# Patient Record
Sex: MALE | Race: OTHER | Employment: STUDENT | URBAN - METROPOLITAN AREA
[De-identification: names, ages, dates, MRNs, and addresses within clinical notes are randomized per-mention and may not be internally consistent; named-entity substitution may affect disease eponyms.]

---
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
## 2020-09-07 ENCOUNTER — HOSPITAL ENCOUNTER (EMERGENCY)
Age: 20
Discharge: HOME | End: 2020-09-07
Payer: COMMERCIAL

## 2020-09-07 VITALS
RESPIRATION RATE: 16 BRPM | HEART RATE: 54 BPM | SYSTOLIC BLOOD PRESSURE: 147 MMHG | OXYGEN SATURATION: 100 % | BODY MASS INDEX: 22.77 KG/M2 | WEIGHT: 187 LBS | HEIGHT: 76 IN | DIASTOLIC BLOOD PRESSURE: 84 MMHG | TEMPERATURE: 97 F

## 2020-09-07 PROCEDURE — 99282 EMERGENCY DEPT VISIT SF MDM: CPT

## 2020-09-07 PROCEDURE — 6370000000 HC RX 637 (ALT 250 FOR IP): Performed by: PHYSICIAN ASSISTANT

## 2020-09-07 RX ORDER — DIPHENHYDRAMINE HCL 25 MG
25 CAPSULE ORAL EVERY 6 HOURS PRN
Qty: 30 CAPSULE | Refills: 0 | Status: SHIPPED | OUTPATIENT
Start: 2020-09-07 | End: 2020-09-17

## 2020-09-07 RX ORDER — PREDNISONE 20 MG/1
60 TABLET ORAL ONCE
Status: COMPLETED | OUTPATIENT
Start: 2020-09-07 | End: 2020-09-07

## 2020-09-07 RX ORDER — PREDNISONE 10 MG/1
10 TABLET ORAL DAILY
Qty: 24 TABLET | Refills: 0 | Status: SHIPPED | OUTPATIENT
Start: 2020-09-07

## 2020-09-07 RX ADMIN — PREDNISONE 60 MG: 20 TABLET ORAL at 13:52

## 2020-09-07 ASSESSMENT — ENCOUNTER SYMPTOMS
RESPIRATORY NEGATIVE: 1
GASTROINTESTINAL NEGATIVE: 1
EYES NEGATIVE: 1

## 2020-09-07 NOTE — ED PROVIDER NOTES
[facility] [facility] ED  eMERGENCY dEPARTMENT eNCOUnter      Pt Name: Shandra Qiu  MRN: [id number]  [place] 2000  Date of evaluation: 9/7/2020  Provider: Verdis Mortimer, PA-C      HISTORY OF PRESENT ILLNESS    Shandra Qiu is a 22 y.o. male who presents to the Emergency Department with chief complaint of rash to lower legs and bilateral upper extremity. Patient states he was doing yard work at his grandma's house yesterday and woke up with diffuse rash. Patient states he had poison ivy in the past approximately 6 years ago. Patient drove himself here today. He has not taken medication prior to arrival.  Patient complains of itching and denies any other complaints. He has no other concerns at this time. REVIEW OF SYSTEMS       Review of Systems   Constitutional: Negative. HENT: Negative. Eyes: Negative. Respiratory: Negative. Cardiovascular: Negative. Gastrointestinal: Negative. Endocrine: Negative. Genitourinary: Negative. Musculoskeletal: Negative. Skin: Positive for rash. Neurological: Negative. Psychiatric/Behavioral: Negative. PAST MEDICAL HISTORY   History reviewed. No pertinent past medical history. SURGICAL HISTORY     History reviewed. No pertinent surgical history. CURRENT MEDICATIONS       Previous Medications    AZITHROMYCIN (ZITHROMAX) 250 MG TABLET    Take 2 tablets (500 mg) on Day 1, followed by 1 tablet (250 mg) once daily on Days 2 through 5. FLUTICASONE (FLONASE) 50 MCG/ACT NASAL SPRAY    1 spray by Nasal route every 12 hours for 15 days       ALLERGIES     Patient has no known allergies. FAMILY HISTORY     History reviewed. No pertinent family history.        SOCIAL HISTORY       Social History     Socioeconomic History    Marital status: Single     Spouse name: None    Number of children: None    Years of education: None    Highest education level: None   Occupational History    None   Social Needs    Financial resource strain: None    Food insecurity     Worry: None     Inability: None    Transportation needs     Medical: None     Non-medical: None   Tobacco Use    Smoking status: Never Smoker   Substance and Sexual Activity    Alcohol use: No     Alcohol/week: 0.0 standard drinks    Drug use: No    Sexual activity: None   Lifestyle    Physical activity     Days per week: None     Minutes per session: None    Stress: None   Relationships    Social connections     Talks on phone: None     Gets together: None     Attends [employer] service: None     Active member of club or organization: None     Attends meetings of clubs or organizations: None     Relationship status: None    Intimate partner violence     Fear of current or ex partner: None     Emotionally abused: None     Physically abused: None     Forced sexual activity: None   Other Topics Concern    None   Social History Narrative    None       SCREENINGS      @FLOW([id number])@      PHYSICAL EXAM    (up to 7 for level 4, 8 or more for level 5)     ED Triage Vitals [09/07/20 1333]   BP Temp Temp Source Pulse Resp SpO2 Height Weight   (!) 147/84 97 °F (36.1 °C) Temporal 54 16 100 % 6' 4\" (1.93 m) 187 lb (84.8 kg)       Physical Exam  Constitutional:       General: He is not in acute distress. Appearance: He is well-developed. HENT:      Head: Normocephalic and atraumatic. Eyes:      Conjunctiva/sclera: Conjunctivae normal.      Pupils: Pupils are equal, round, and reactive to light. Neck:      Musculoskeletal: Normal range of motion and neck supple. Cardiovascular:      Rate and Rhythm: Normal rate and regular rhythm. Heart sounds: No murmur. Pulmonary:      Effort: No respiratory distress. Breath sounds: Normal breath sounds. No wheezing or rales. Abdominal:      General: There is no distension. Palpations: Abdomen is soft. Tenderness: There is no abdominal tenderness. Musculoskeletal: Normal range of motion.

## 2020-09-07 NOTE — ED TRIAGE NOTES
Pt to ER with c/o itchy pink rash all over his arm and legs. States he was cutting down bushes yesterday and they had poison ivy in them. Pt used OTC cream at home but continues to c/o itching. Denies pain.

## 2024-01-12 ENCOUNTER — HOSPITAL ENCOUNTER (EMERGENCY)
Facility: HOSPITAL | Age: 24
Discharge: HOME | End: 2024-01-12
Attending: STUDENT IN AN ORGANIZED HEALTH CARE EDUCATION/TRAINING PROGRAM
Payer: COMMERCIAL

## 2024-01-12 ENCOUNTER — APPOINTMENT (OUTPATIENT)
Dept: RADIOLOGY | Facility: HOSPITAL | Age: 24
End: 2024-01-12
Payer: COMMERCIAL

## 2024-01-12 VITALS
RESPIRATION RATE: 18 BRPM | SYSTOLIC BLOOD PRESSURE: 158 MMHG | WEIGHT: 189.6 LBS | TEMPERATURE: 97.2 F | BODY MASS INDEX: 22.39 KG/M2 | HEART RATE: 60 BPM | DIASTOLIC BLOOD PRESSURE: 81 MMHG | HEIGHT: 77 IN | OXYGEN SATURATION: 99 %
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
## 2024-01-12 PROCEDURE — 99284 EMERGENCY DEPT VISIT MOD MDM: CPT | Performed by: STUDENT IN AN ORGANIZED HEALTH CARE EDUCATION/TRAINING PROGRAM

## 2024-01-12 PROCEDURE — 73090 X-RAY EXAM OF FOREARM: CPT | Mod: LT

## 2024-01-12 PROCEDURE — 2500000005 HC RX 250 GENERAL PHARMACY W/O HCPCS: Performed by: STUDENT IN AN ORGANIZED HEALTH CARE EDUCATION/TRAINING PROGRAM

## 2024-01-12 PROCEDURE — 2500000001 HC RX 250 WO HCPCS SELF ADMINISTERED DRUGS (ALT 637 FOR MEDICARE OP): Performed by: STUDENT IN AN ORGANIZED HEALTH CARE EDUCATION/TRAINING PROGRAM

## 2024-01-12 PROCEDURE — 99283 EMERGENCY DEPT VISIT LOW MDM: CPT | Mod: 25

## 2024-01-12 PROCEDURE — 12002 RPR S/N/AX/GEN/TRNK2.6-7.5CM: CPT | Performed by: STUDENT IN AN ORGANIZED HEALTH CARE EDUCATION/TRAINING PROGRAM

## 2024-01-12 PROCEDURE — 90715 TDAP VACCINE 7 YRS/> IM: CPT | Performed by: STUDENT IN AN ORGANIZED HEALTH CARE EDUCATION/TRAINING PROGRAM

## 2024-01-12 PROCEDURE — 96372 THER/PROPH/DIAG INJ SC/IM: CPT | Performed by: STUDENT IN AN ORGANIZED HEALTH CARE EDUCATION/TRAINING PROGRAM

## 2024-01-12 PROCEDURE — 73090 X-RAY EXAM OF FOREARM: CPT | Mod: LEFT SIDE | Performed by: RADIOLOGY

## 2024-01-12 PROCEDURE — 2500000004 HC RX 250 GENERAL PHARMACY W/ HCPCS (ALT 636 FOR OP/ED): Performed by: STUDENT IN AN ORGANIZED HEALTH CARE EDUCATION/TRAINING PROGRAM

## 2024-01-12 PROCEDURE — 90471 IMMUNIZATION ADMIN: CPT | Performed by: STUDENT IN AN ORGANIZED HEALTH CARE EDUCATION/TRAINING PROGRAM

## 2024-01-12 RX ORDER — LIDOCAINE HYDROCHLORIDE 10 MG/ML
5 INJECTION INFILTRATION; PERINEURAL ONCE
Status: COMPLETED | OUTPATIENT
Start: 2024-01-12 | End: 2024-01-12

## 2024-01-12 RX ORDER — AMOXICILLIN AND CLAVULANATE POTASSIUM 875; 125 MG/1; MG/1
1 TABLET, FILM COATED ORAL EVERY 12 HOURS
Qty: 14 TABLET | Refills: 0 | Status: SHIPPED | OUTPATIENT
Start: 2024-01-12 | End: 2024-01-22

## 2024-01-12 RX ORDER — AMOXICILLIN AND CLAVULANATE POTASSIUM 875; 125 MG/1; MG/1
1 TABLET, FILM COATED ORAL ONCE
Status: COMPLETED | OUTPATIENT
Start: 2024-01-12 | End: 2024-01-12

## 2024-01-12 RX ORDER — BACITRACIN 500 [USP'U]/G
OINTMENT TOPICAL ONCE
Status: COMPLETED | OUTPATIENT
Start: 2024-01-12 | End: 2024-01-12

## 2024-01-12 RX ADMIN — TETANUS TOXOID, REDUCED DIPHTHERIA TOXOID AND ACELLULAR PERTUSSIS VACCINE, ADSORBED 0.5 ML: 5; 2.5; 8; 8; 2.5 SUSPENSION INTRAMUSCULAR at 20:44

## 2024-01-12 RX ADMIN — BACITRACIN: 500 OINTMENT TOPICAL at 21:56

## 2024-01-12 RX ADMIN — LIDOCAINE HYDROCHLORIDE 50 MG: 10 INJECTION, SOLUTION INFILTRATION; PERINEURAL at 20:44

## 2024-01-12 RX ADMIN — AMOXICILLIN AND CLAVULANATE POTASSIUM 875 MG: 875; 125 TABLET, FILM COATED ORAL at 20:45

## 2024-01-12 ASSESSMENT — LIFESTYLE VARIABLES
EVER HAD A DRINK FIRST THING IN THE MORNING TO STEADY YOUR NERVES TO GET RID OF A HANGOVER: NO
HAVE PEOPLE ANNOYED YOU BY CRITICIZING YOUR DRINKING: NO
HAVE YOU EVER FELT YOU SHOULD CUT DOWN ON YOUR DRINKING: NO
EVER FELT BAD OR GUILTY ABOUT YOUR DRINKING: NO
REASON UNABLE TO ASSESS: NO

## 2024-01-12 ASSESSMENT — PAIN SCALES - GENERAL
PAINLEVEL_OUTOF10: 8
PAINLEVEL_OUTOF10: 8

## 2024-01-12 ASSESSMENT — COLUMBIA-SUICIDE SEVERITY RATING SCALE - C-SSRS
2. HAVE YOU ACTUALLY HAD ANY THOUGHTS OF KILLING YOURSELF?: NO
6. HAVE YOU EVER DONE ANYTHING, STARTED TO DO ANYTHING, OR PREPARED TO DO ANYTHING TO END YOUR LIFE?: NO
1. IN THE PAST MONTH, HAVE YOU WISHED YOU WERE DEAD OR WISHED YOU COULD GO TO SLEEP AND NOT WAKE UP?: NO

## 2024-01-12 ASSESSMENT — PAIN - FUNCTIONAL ASSESSMENT: PAIN_FUNCTIONAL_ASSESSMENT: 0-10

## 2024-01-12 ASSESSMENT — PAIN DESCRIPTION - PROGRESSION: CLINICAL_PROGRESSION: NOT CHANGED

## 2024-01-13 NOTE — ED PROVIDER NOTES
"HPI   Chief Complaint   Patient presents with    Animal Bite     \"I was breaking up dogs fighting at friends house and I got my left forearm bit.  Shots are up to date on dog\"       Patient is a 23-year-old male with no significant past medical history who presents for a dog bite.  Patient states he was at a friend's house when he was trying to break up a fight between 2 dogs, was bitten in the left forearm by a pit bull, dog is up-to-date on its shots.  Unknown date of his own last tetanus.  States this happened prior to arrival.  Endorses pain over the left proximal forearm, no numbness or tingling in the hand.                          Bonnie Coma Scale Score: 15                  Patient History   History reviewed. No pertinent past medical history.  History reviewed. No pertinent surgical history.  No family history on file.  Social History     Tobacco Use    Smoking status: Never    Smokeless tobacco: Never   Vaping Use    Vaping Use: Never used   Substance Use Topics    Alcohol use: Not on file    Drug use: Never       Physical Exam   ED Triage Vitals [01/12/24 1904]   Temp Heart Rate Resp BP   36.2 °C (97.2 °F) 62 16 156/86      SpO2 Temp Source Heart Rate Source Patient Position   99 % Tympanic Monitor Sitting      BP Location FiO2 (%)     Right arm --       Physical Exam  Constitutional:       General: He is not in acute distress.  HENT:      Head: Normocephalic.   Eyes:      Extraocular Movements: Extraocular movements intact.      Conjunctiva/sclera: Conjunctivae normal.      Pupils: Pupils are equal, round, and reactive to light.   Cardiovascular:      Rate and Rhythm: Normal rate and regular rhythm.      Pulses: Normal pulses.      Heart sounds: Normal heart sounds.   Pulmonary:      Effort: Pulmonary effort is normal.      Breath sounds: Normal breath sounds.   Abdominal:      General: There is no distension.      Palpations: Abdomen is soft. There is no mass.      Tenderness: There is no abdominal " tenderness. There is no guarding.   Musculoskeletal:         General: No deformity.      Cervical back: Normal range of motion and neck supple.      Right lower leg: No edema.      Left lower leg: No edema.      Comments: Approximately 4 cm laceration to left forearm without involvement of muscle/tendons.  No pulsatile bleeding.  Approximately 5 mm punctate wound to posterior aspect of left proximal forearm.  2+ radial pulses bilaterally   Skin:     General: Skin is warm and dry.      Findings: No lesion or rash.   Neurological:      General: No focal deficit present.      Mental Status: He is alert and oriented to person, place, and time. Mental status is at baseline.      Cranial Nerves: No cranial nerve deficit.      Sensory: No sensory deficit.      Motor: No weakness.      Comments: Intact strength and sensation in bilateral hands, intact sensation in median/radial/ulnar nerve distributions.   Psychiatric:         Mood and Affect: Mood normal.         ED Course & MDM   Diagnoses as of 01/13/24 1035   Dog bite, initial encounter       Medical Decision Making  Patient is a 23-year-old male with above-stated past medical history who presents for a dog bite.  Patient's tetanus was updated, he was given a dose of Augmentin in the emergency department. Xray was negative for foreign bodies, low suspicion for radiolucent foreign body.  His wound was cleaned, irrigated, and repaired, please see set procedure note.  Patient's punctate wound on his left arm was left open given its small size and the nature of the injury, this was irrigated.  Patient was given a prescription for Augmentin.  I discussed strict return precautions with him in detail.  He stated understanding agreement.  I did advise him to follow-up with his physician on Monday for reevaluation.  Patient stated understanding agreement.  No involvement of muscle tissue, nerves, tendons or ligaments.  Patient was discharged home in stable  condition.    Disclaimer: This note was dictated using speech recognition software. Minor errors in transcription may be present. Please call if questions.     Gibran Adams MD  [facility]  Contact on [employer]      Problems Addressed:  Dog bite, initial encounter: acute illness or injury    Amount and/or Complexity of Data Reviewed  Radiology: ordered.        Procedure  Laceration Repair    Performed by: Gibran Adams MD  Authorized by: Gibran Adams MD    Consent:     Consent obtained:  Verbal    Consent given by:  Patient    Risks discussed:  Infection  Anesthesia:     Anesthesia method:  Local infiltration    Local anesthetic:  Lidocaine 1% w/o epi  Laceration details:     Location:  Shoulder/arm    Shoulder/arm location:  L lower arm    Length (cm):  4    Depth (mm):  4  Pre-procedure details:     Preparation:  Patient was prepped and draped in usual sterile fashion and imaging obtained to evaluate for foreign bodies  Exploration:     Limited defect created (wound extended): no      Hemostasis achieved with:  Direct pressure    Imaging outcome: foreign body not noted      Wound exploration: wound explored through full range of motion and entire depth of wound visualized      Wound extent: no muscle damage noted, no nerve damage noted and no tendon damage noted      Contaminated: no    Treatment:     Area cleansed with:  Chlorhexidine    Amount of cleaning:  Standard    Irrigation solution:  Sterile saline    Irrigation volume:  750cc    Irrigation method:  Tap    Visualized foreign bodies/material removed: no      Debridement:  None    Undermining:  None    Scar revision: no    Skin repair:     Repair method:  Sutures    Suture size:  4-0    Suture material:  Prolene    Suture technique:  Simple interrupted    Number of sutures:  3  Approximation:     Approximation:  Loose  Repair type:     Repair type:  Simple  Post-procedure details:     Dressing:  Antibiotic ointment and non-adherent  dressing    Procedure completion:  Tolerated well, no immediate complications       Gibran Adams MD  01/13/24 0797

## 2025-06-24 ENCOUNTER — APPOINTMENT (OUTPATIENT)
Dept: GENERAL RADIOLOGY | Age: 25
DRG: 908 | End: 2025-06-24
Payer: MEDICAID

## 2025-06-24 ENCOUNTER — APPOINTMENT (OUTPATIENT)
Dept: CT IMAGING | Age: 25
DRG: 908 | End: 2025-06-24
Payer: MEDICAID

## 2025-06-24 ENCOUNTER — HOSPITAL ENCOUNTER (INPATIENT)
Age: 25
LOS: 2 days | Discharge: HOME | DRG: 908 | End: 2025-06-26
Admitting: ORTHOPAEDIC SURGERY
Payer: MEDICAID

## 2025-06-24 PROBLEM — S86.221A: Status: ACTIVE | Noted: 2025-06-24

## 2025-06-24 PROBLEM — S81.811A LEG LACERATION, RIGHT, INITIAL ENCOUNTER: Status: ACTIVE | Noted: 2025-06-24

## 2025-06-24 PROBLEM — S87.81XA: Status: ACTIVE | Noted: 2025-06-24

## 2025-06-24 ASSESSMENT — LIFESTYLE VARIABLES
HOW MANY STANDARD DRINKS CONTAINING ALCOHOL DO YOU HAVE ON A TYPICAL DAY: 3 OR 4
HOW OFTEN DO YOU HAVE A DRINK CONTAINING ALCOHOL: 2-4 TIMES A MONTH

## 2025-06-24 ASSESSMENT — PAIN SCALES - GENERAL
PAINLEVEL_OUTOF10: 7
PAINLEVEL_OUTOF10: 10

## 2025-06-24 ASSESSMENT — PAIN DESCRIPTION - LOCATION
LOCATION: LEG
LOCATION: FOOT
LOCATION: ANKLE

## 2025-06-24 ASSESSMENT — PAIN - FUNCTIONAL ASSESSMENT: PAIN_FUNCTIONAL_ASSESSMENT: 0-10

## 2025-06-24 ASSESSMENT — PAIN DESCRIPTION - ORIENTATION
ORIENTATION: RIGHT
ORIENTATION: RIGHT;LOWER

## 2025-06-24 ASSESSMENT — PAIN DESCRIPTION - PAIN TYPE: TYPE: ACUTE PAIN

## 2025-06-24 NOTE — ED PROVIDER NOTES
1:20 PM EDT  [facility] ORTHO TELE  EMERGENCY DEPARTMENT ENCOUNTER      Pt Name: Antonio Chamberlain  MRN: [id number]  Birthdate 2000  Date of evaluation: 6/24/2025  Provider: Kelly Dos Santos MD    CHIEF COMPLAINT       Chief Complaint   Patient presents with    Leg Injury     Engine fell onto legs about 10 min ago         HISTORY OF PRESENT ILLNESS   (Location/Symptom, Timing/Onset, Context/Setting, Quality, Duration, Modifying Factors, Severity)  Note limiting factors.       Antonio Chamberlain is a 24 y.o. male with no significant past medical history who presents to the emergency department for chief complaint of a traumatic injury sustained to his legs.  Patient states that about 10 minutes ago, he was carrying a heavy engine, it slipped out of his hands, and landed on his right and left lower leg, with most of the impact being on the right.  Patient does endorse moving away, but states that the engine still caught him.  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  Patient is endorsing significant pain at this time.  Patient however is neuro vastly intact, and is able to wiggle his toes with distal and pulses noted.  The history is provided by the patient, medical records and a relative.       Nursing Notes were reviewed.    REVIEW OF SYSTEMS    (2-9 systems for level 4, 10 or more for level 5)     Review of Systems   All other systems reviewed and are negative.      Except as noted above the remainder of the review of systems was reviewed and negative.       PAST MEDICAL HISTORY   No past medical history on file.      SURGICAL HISTORY     No past surgical history on file.      CURRENT MEDICATIONS       Current Discharge Medication List        CONTINUE these medications which have NOT CHANGED    Details   predniSONE (DELTASONE) 10 MG tablet Take 1 tablet by mouth daily 6 tablets by mouth on day 1 and decrease by 1 tablet daily until gone  Qty: 24 tablet, Refills: 0      fluticasone (FLONASE) 50 MCG/ACT nasal

## 2025-06-24 NOTE — H&P
HISTORY AND PHYSICAL               Date: 6/24/2025        Patient Name: Antonio Chamberlain     MRN: [id number] YOB: 2000      Age:  24 y.o.      Assessment/Plan      [unfilled]    Right leg soft tissue injury with right ankle arthrotomy and suspected tendon injuries and at least partial laceration of anterior compartment musculature  Weightbearing status: Non weight-bearing right lower extremity  Patient started on prophylactic antibiotics upon arrival to ER, will continue until OR  Tetanus updated  Plan on taking to OR in a.m. for I&D, wound exploration, and repair of any injured tendons or other structures as indicated  Okay for patient to have diet now, will make n.p.o. after midnight    2.  Superficial abrasions over left leg and knee  Weightbearing status: Weightbearing as tolerated left lower extremity  Will manage with local wound care, does not require surgical intervention      Orders Placed This Encounter   Procedures    XR ANKLE LEFT (MIN 3 VIEWS)     Standing Status:   Standing     Number of Occurrences:   1     Reason for exam::   Injury/Pain    XR TIBIA FIBULA LEFT (2 VIEWS)     Standing Status:   Standing     Number of Occurrences:   1     Reason for exam::   Injury/Pain    XR TIBIA FIBULA RIGHT (2 VIEWS)     Standing Status:   Standing     Number of Occurrences:   1     Reason for exam::   crush injury    XR ANKLE RIGHT (MIN 3 VIEWS)     Standing Status:   Standing     Number of Occurrences:   1     Reason for exam::   crush injury    XR FOOT RIGHT (MIN 3 VIEWS)     Standing Status:   Standing     Number of Occurrences:   1     Reason for exam::   crush injury    CTA ABDOMINAL AORTA W BILAT RUNOFF W WO CONTRAST     Standing Status:   Standing     Number of Occurrences:   1     Reason for exam::   crush injury     Additional Contrast?:   None    CT ANKLE RIGHT WO CONTRAST     Standing Status:   Standing     Number of Occurrences:   1     Reason for exam::   evaluation of distal tibia fracture      Decision Support Exception - unselect if not a suspected or confirmed emergency medical condition:   Emergency Medical Condition (MA) [1]    CBC with Auto Differential     Standing Status:   Standing     Number of Occurrences:   1    CMP     Standing Status:   Standing     Number of Occurrences:   1    ETOH     Standing Status:   Standing     Number of Occurrences:   1    CK     Standing Status:   Standing     Number of Occurrences:   1    Magnesium     Standing Status:   Standing     Number of Occurrences:   1    Lactate, Sepsis     Standing Status:   Standing     Number of Occurrences:   2    SPECIMEN REJECTION     Standing Status:   Standing     Number of Occurrences:   1    Protime-INR     Standing Status:   Standing     Number of Occurrences:   1    APTT     Standing Status:   Standing     Number of Occurrences:   1    Apply dressing     Xeroform dressing per dr.     Standing Status:   Standing     Number of Occurrences:   1    Inpatient consult to Orthopedic Surgery     Standing Status:   Standing     Number of Occurrences:   1     Reason for Consult?:   traumatic injury of leg     Provider Contacted?:   Yes    EKG 12 Lead     Standing Status:   Standing     Number of Occurrences:   1     Reason for Exam?:   Screening    Type and Screen     Standing Status:   Standing     Number of Occurrences:   1     Orders Placed This Encounter   Medications    sodium chloride 0.9 % bolus 500 mL    fentaNYL (SUBLIMAZE) injection 50 mcg     Refill:  0    sodium chloride 0.9 % bolus 1,000 mL    tetanus & diphtheria toxoids (adult) 5-2 LFU injection 0.5 mL    ceFAZolin (ANCEF) 2,000 mg in sterile water 20 mL IV syringe     Antimicrobial Indications:   Skin and Soft Tissue Infection     Antimicrobial Indications:   Surgical Prophylaxis    iopamidol (ISOVUE-370) 76 % injection 125 mL    0.9 % sodium chloride infusion    piperacillin-tazobactam (ZOSYN) 3,375 mg in sodium chloride 0.9 % 50 mL IVPB (addEASE)     Antimicrobial

## 2025-06-24 NOTE — H&P
24-year-old man dropped an engine on his right foot has a right foot injury already been evaluated by Ortho and is planned for tendon repair.  Patient already received tetanus shot.  Patient going for in the morning for I&D and wound exploration and repair of any injured tendons.  Does not take any medications at home.  Not does not have any medical history.  Patient received Zosyn in the ER for prophylaxis.          No past medical history on file.  No past surgical history on file.  Social History     Socioeconomic History    Marital status: Single     Spouse name: Not on file    Number of children: Not on file    Years of education: Not on file    Highest education level: Not on file   Occupational History    Not on file   Tobacco Use    Smoking status: Never    Smokeless tobacco: Not on file   Substance and Sexual Activity    Alcohol use: No     Alcohol/week: 0.0 standard drinks of alcohol    Drug use: No    Sexual activity: Not on file   Other Topics Concern    Not on file   Social History Narrative    Not on file     Social Drivers of Health     Financial Resource Strain: Low Risk  (8/1/2024)    Received from [facility]    Overall Financial Resource Strain (CARDIA)     Difficulty of Paying Living Expenses: Not hard at all   Food Insecurity: No Food Insecurity (8/1/2024)    Received from [facility]    Hunger Vital Sign     Worried About Running Out of Food in the Last Year: Never true     Ran Out of Food in the Last Year: Never true   Transportation Needs: No Transportation Needs (8/1/2024)    Received from [facility]    PRAPARE - Transportation     Lack of Transportation (Medical): No     Lack of Transportation (Non-Medical): No   Physical Activity: Inactive (8/1/2024)    Received from [facility]    Exercise Vital Sign     Days of Exercise per Week: 0 days     Minutes of Exercise per Session: 30 min   Stress: No

## 2025-06-24 NOTE — CARE COORDINATION
Case Management Assessment  Initial Evaluation    Date/Time of Evaluation: 6/24/2025 6:14 PM  Assessment Completed by: Reny Rothman RN    If patient is discharged prior to next notation, then this note serves as note for discharge by case management.    Patient Name: Antonio Chamberlain                   YOB: 2000  Diagnosis: Lactic acidosis [E87.20]  Elevated CK [R74.8]  Leg laceration, right, initial encounter [S81.811A]  Crush injury lower leg, right, initial encounter [S87.81XA]  Injury of right lower extremity, initial encounter [S89.91XA]  Anterior tibial tendon tear, traumatic [S86.219A]                   Date / Time: 6/24/2025 12:53 PM    Patient Admission Status: Inpatient   Readmission Risk (Low < 19, Mod (19-27), High > 27): Readmission Risk Score: 3.9    Current PCP: Arjun Gooden, DO  PCP verified by CM? Yes    Chart Reviewed: Yes      History Provided by: Patient  Patient Orientation: Alert and Oriented, Person, Place, Situation, Self    Patient Cognition: Alert    Hospitalization in the last 30 days (Readmission):  No    If yes, Readmission Assessment in CM Navigator will be completed.    Advance Directives:      Code Status: Full Code   Patient's Primary Decision Maker is: Legal Next of Kin (mom Whitney.)      Discharge Planning:    Patient lives with: (P) Alone Type of Home: (P) House  Primary Care Giver: Self  Patient Support Systems include: Family Members   Current Financial resources: Other (Comment) (is employed full time.)  Current community resources: (P) None  Current services prior to admission: (P) None            Current DME:              Type of Home Care services:  (P) None    ADLS  Prior functional level: Independent in ADLs/IADLs  Current functional level: Independent in ADLs/IADLs    PT AM-PAC:   /24  OT AM-PAC:   /24    Family can provide assistance at DC: Yes  Would you like Case Management to discuss the discharge plan with any other family members/significant

## 2025-06-24 NOTE — ED TRIAGE NOTES
A & Ox4. Skin pink, cool, diaphoretic. States he was working on an engine when it fell onto his legs. Lac noted to just above left knee. Ecchymosis noted down most of left lower leg with lac to bottom. Large lac noted to right lower anterior leg down to tendons

## 2025-06-25 ENCOUNTER — ANESTHESIA EVENT (OUTPATIENT)
Dept: OPERATING ROOM | Age: 25
DRG: 908 | End: 2025-06-25
Payer: MEDICAID

## 2025-06-25 ENCOUNTER — ANESTHESIA (OUTPATIENT)
Dept: OPERATING ROOM | Age: 25
DRG: 908 | End: 2025-06-25
Payer: MEDICAID

## 2025-06-25 PROCEDURE — 2500000003 HC RX 250 WO HCPCS: Performed by: ORTHOPAEDIC SURGERY

## 2025-06-25 PROCEDURE — 6360000002 HC RX W HCPCS

## 2025-06-25 PROCEDURE — 6360000002 HC RX W HCPCS: Performed by: ORTHOPAEDIC SURGERY

## 2025-06-25 PROCEDURE — 2500000003 HC RX 250 WO HCPCS: Performed by: REGISTERED NURSE

## 2025-06-25 PROCEDURE — 2580000003 HC RX 258: Performed by: INTERNAL MEDICINE

## 2025-06-25 RX ORDER — FENTANYL CITRATE 50 UG/ML
INJECTION, SOLUTION INTRAMUSCULAR; INTRAVENOUS
Status: DISCONTINUED | OUTPATIENT
Start: 2025-06-25 | End: 2025-06-25 | Stop reason: SDUPTHER

## 2025-06-25 RX ORDER — EPHEDRINE SULFATE/0.9% NACL/PF 25 MG/5 ML
SYRINGE (ML) INTRAVENOUS
Status: DISCONTINUED | OUTPATIENT
Start: 2025-06-25 | End: 2025-06-25 | Stop reason: SDUPTHER

## 2025-06-25 RX ORDER — ONDANSETRON 2 MG/ML
INJECTION INTRAMUSCULAR; INTRAVENOUS
Status: DISCONTINUED | OUTPATIENT
Start: 2025-06-25 | End: 2025-06-25 | Stop reason: SDUPTHER

## 2025-06-25 RX ORDER — DEXAMETHASONE SODIUM PHOSPHATE 10 MG/ML
INJECTION, SOLUTION INTRA-ARTICULAR; INTRALESIONAL; INTRAMUSCULAR; INTRAVENOUS; SOFT TISSUE
Status: DISCONTINUED | OUTPATIENT
Start: 2025-06-25 | End: 2025-06-25 | Stop reason: SDUPTHER

## 2025-06-25 RX ORDER — PROPOFOL 10 MG/ML
INJECTION, EMULSION INTRAVENOUS
Status: DISCONTINUED | OUTPATIENT
Start: 2025-06-25 | End: 2025-06-25 | Stop reason: SDUPTHER

## 2025-06-25 RX ORDER — MIDAZOLAM HYDROCHLORIDE 1 MG/ML
INJECTION, SOLUTION INTRAMUSCULAR; INTRAVENOUS
Status: DISCONTINUED | OUTPATIENT
Start: 2025-06-25 | End: 2025-06-25 | Stop reason: SDUPTHER

## 2025-06-25 RX ADMIN — PROPOFOL 200 MG: 10 INJECTION, EMULSION INTRAVENOUS at 11:47

## 2025-06-25 RX ADMIN — EPHEDRINE SULFATE 5 MG: 5 INJECTION INTRAVENOUS at 12:09

## 2025-06-25 RX ADMIN — MIDAZOLAM HYDROCHLORIDE 2 MG: 1 INJECTION, SOLUTION INTRAMUSCULAR; INTRAVENOUS at 11:42

## 2025-06-25 RX ADMIN — EPHEDRINE SULFATE 5 MG: 5 INJECTION INTRAVENOUS at 13:13

## 2025-06-25 RX ADMIN — PROPOFOL 30 MG: 10 INJECTION, EMULSION INTRAVENOUS at 13:43

## 2025-06-25 RX ADMIN — EPHEDRINE SULFATE 5 MG: 5 INJECTION INTRAVENOUS at 12:07

## 2025-06-25 RX ADMIN — FENTANYL CITRATE 100 MCG: 50 INJECTION, SOLUTION INTRAMUSCULAR; INTRAVENOUS at 11:47

## 2025-06-25 RX ADMIN — ONDANSETRON 4 MG: 2 INJECTION, SOLUTION INTRAMUSCULAR; INTRAVENOUS at 11:47

## 2025-06-25 RX ADMIN — EPHEDRINE SULFATE 5 MG: 5 INJECTION INTRAVENOUS at 13:02

## 2025-06-25 RX ADMIN — CEFAZOLIN 2000 MG: 2 INJECTION, POWDER, FOR SOLUTION INTRAMUSCULAR; INTRAVENOUS at 11:59

## 2025-06-25 RX ADMIN — SODIUM CHLORIDE: 0.9 INJECTION, SOLUTION INTRAVENOUS at 12:13

## 2025-06-25 RX ADMIN — DEXAMETHASONE SODIUM PHOSPHATE 10 MG: 10 INJECTION INTRAMUSCULAR; INTRAVENOUS at 12:26

## 2025-06-25 RX ADMIN — EPHEDRINE SULFATE 5 MG: 5 INJECTION INTRAVENOUS at 13:29

## 2025-06-25 RX ADMIN — FENTANYL CITRATE 100 MCG: 50 INJECTION, SOLUTION INTRAMUSCULAR; INTRAVENOUS at 13:48

## 2025-06-25 ASSESSMENT — PAIN DESCRIPTION - LOCATION
LOCATION: ANKLE
LOCATION: LEG;FOOT
LOCATION: LEG
LOCATION: LEG
LOCATION: FOOT
LOCATION: ANKLE
LOCATION: LEG

## 2025-06-25 ASSESSMENT — ENCOUNTER SYMPTOMS
DIARRHEA: 0
COUGH: 0
NAUSEA: 0
SHORTNESS OF BREATH: 0
VOMITING: 0

## 2025-06-25 ASSESSMENT — PAIN SCALES - GENERAL
PAINLEVEL_OUTOF10: 8
PAINLEVEL_OUTOF10: 9
PAINLEVEL_OUTOF10: 10
PAINLEVEL_OUTOF10: 8
PAINLEVEL_OUTOF10: 5
PAINLEVEL_OUTOF10: 10
PAINLEVEL_OUTOF10: 4
PAINLEVEL_OUTOF10: 10
PAINLEVEL_OUTOF10: 9
PAINLEVEL_OUTOF10: 9
PAINLEVEL_OUTOF10: 10
PAINLEVEL_OUTOF10: 9
PAINLEVEL_OUTOF10: 10

## 2025-06-25 ASSESSMENT — PAIN DESCRIPTION - DESCRIPTORS
DESCRIPTORS: ACHING;SHARP
DESCRIPTORS: THROBBING
DESCRIPTORS: ACHING;SHARP
DESCRIPTORS: ACHING

## 2025-06-25 ASSESSMENT — PAIN DESCRIPTION - ORIENTATION
ORIENTATION: RIGHT

## 2025-06-25 NOTE — ANESTHESIA PRE PROCEDURE
Department of Anesthesiology  Preprocedure Note       Name:  Antonio Chamberlain   Age:  24 y.o.  :  2000                                          MRN:  [id number]         Date:  2025      Surgeon: Surgeon(s):  El Gregg MD    Procedure: Procedure(s):  INCISION AND DRAINAGE RIGHT  LEG AND ANKLE POSSIBLE TENDON REPAIR ROOM: 273    Medications prior to admission:   Prior to Admission medications    Medication Sig Start Date End Date Taking? Authorizing Provider   predniSONE (DELTASONE) 10 MG tablet Take 1 tablet by mouth daily 6 tablets by mouth on day 1 and decrease by 1 tablet daily until gone  Patient not taking: Reported on 2025   Hattie Waite, PA-C   fluticasone (FLONASE) 50 MCG/ACT nasal spray 1 spray by Nasal route every 12 hours for 15 days 16  Belinda Cerda MD   azithromycin (ZITHROMAX) 250 MG tablet Take 2 tablets (500 mg) on Day 1, followed by 1 tablet (250 mg) once daily on Days 2 through 5.  Patient not taking: Reported on 2025 10/7/16   Ronnie Cameron MD       Current medications:    Current Facility-Administered Medications   Medication Dose Route Frequency Provider Last Rate Last Admin   • sodium chloride flush 0.9 % injection 5-40 mL  5-40 mL IntraVENous 2 times per day Joyce Aguayo MD       • sodium chloride flush 0.9 % injection 5-40 mL  5-40 mL IntraVENous PRN Joyce Aguayo MD       • 0.9 % sodium chloride infusion   IntraVENous PRN Joyce Aguayo MD       • potassium chloride (KLOR-CON M) extended release tablet 40 mEq  40 mEq Oral PRN Joyce Aguayo MD        Or   • potassium bicarb-citric acid (EFFER-K) effervescent tablet 40 mEq  40 mEq Oral PRN Joyce Aguayo MD        Or   • potassium chloride 10 mEq/100 mL IVPB (Peripheral Line)  10 mEq IntraVENous PRN Joyce Aguayo MD       • magnesium sulfate 2000 mg in 50 mL IVPB premix  2,000 mg IntraVENous PRN Joyce Aguayo MD       • [Held by provider] enoxaparin (LOVENOX) injection 40 mg  40 mg

## 2025-06-25 NOTE — PROGRESS NOTES
Patient returned from post op in bed. He is slightly drowsy but answer questions appropriately. Patient currently has 2 L of O2 per NC. Surgical dressing to RLE is clean, dry and intact. Pedal pulses are present, he denies numbness but report tingling. Skin color is pink and warm. Family present at bedside declines further needs. Plan of care ongoing.

## 2025-06-25 NOTE — ACP (ADVANCE CARE PLANNING)
Advance Care Planning     Advance Care Planning Activator (Inpatient)  Conversation Note      Date of ACP Conversation: 6/24/2025     Conversation Conducted with: Patient with Decision Making Capacity    ACP Activator: Reny Rothman RN        Health Care Decision Maker:     Current Designated Health Care Decision Maker:     Primary Decision Maker: [place] - Parent, Legal Guardian - [phone]    Secondary Decision Maker: Brian Williamson - Other Relative - [phone]

## 2025-06-25 NOTE — PROGRESS NOTES
Physician Progress Note      PATIENT:               NIK ROMO  CSN #:                  [id number]  :                       2000  ADMIT DATE:       2025 12:53 PM  DISCH DATE:  RESPONDING  PROVIDER #:        Joyce Aguayo MD          QUERY TEXT:    Rhabdomyolysis is documented in the medical record *** (date/provider).    Please specify the type:    The clinical indicators include:  -male age 24  -25 Total   -25 H&P Dr Aguayo: \"mild rhabdo Monitor CK IVF\"  Options provided:  -- This patient has traumatic rhabdomyolysis.  -- This patient has nontraumatic rhabdomyolysis.  -- Other - I will add my own diagnosis  -- Disagree - Not applicable / Not valid  -- Disagree - Clinically unable to determine / Unknown  -- Refer to Clinical Documentation Reviewer    PROVIDER RESPONSE TEXT:    This patient has traumatic rhabdomyolysis.    Query created by: Srinivas Guzman on 2025 12:13 PM      Electronically signed by:  Joyce Aguayo MD 2025 12:37 PM

## 2025-06-25 NOTE — PROGRESS NOTES
Progress Note  Date:2025       Room:W273/W273-01  Patient Name:Antonio Chamberlain     YOB: 2000     Age:24 y.o.        Subjective    Subjective:  Symptoms:  No shortness of breath, malaise, cough, chest pain, weakness, headache, chest pressure, anorexia, diarrhea or anxiety.    Diet:  No nausea or vomiting.       Review of Systems   Respiratory:  Negative for cough and shortness of breath.    Cardiovascular:  Negative for chest pain.   Gastrointestinal:  Negative for anorexia, diarrhea, nausea and vomiting.   Neurological:  Negative for weakness.     Objective         Vitals Last 24 Hours:  TEMPERATURE:  Temp  Av.1 °F (36.7 °C)  Min: 98 °F (36.7 °C)  Max: 98.3 °F (36.8 °C)  RESPIRATIONS RANGE: Resp  Av.4  Min: 10  Max: 24  PULSE OXIMETRY RANGE: SpO2  Av.1 %  Min: 97 %  Max: 100 %  PULSE RANGE: Pulse  Av.2  Min: 53  Max: 111  BLOOD PRESSURE RANGE: Systolic (24hrs), Av , Min:123 , Max:166   ; Diastolic (24hrs), Av, Min:60, Max:97    I/O (24Hr):    Intake/Output Summary (Last 24 hours) at 2025  Last data filed at 2025 1820  Gross per 24 hour   Intake 1000 ml   Output 2000 ml   Net -1000 ml     Objective:  General Appearance:  Comfortable, well-appearing and in no acute distress.    Vital signs: (most recent): Blood pressure 136/86, pulse 75, temperature 98 °F (36.7 °C), temperature source Oral, resp. rate 16, height 1.93 m (6' 4\"), weight 93.4 kg (206 lb), SpO2 98%.    HEENT: Normal HEENT exam.    Lungs:  He is not in respiratory distress.    Heart: S1 normal and S2 normal.    Abdomen: Abdomen is soft.  Bowel sounds are normal.     Extremities: Normal range of motion.    Pulses: Distal pulses are intact.    Neurological: Patient is alert.    Pupils:  Pupils are equal, round, and reactive to light.    Skin:  Warm.      Labs/Imaging/Diagnostics    Labs:  CBC:  Recent Labs     25  1316 25  0511   WBC 8.1 8.0   RBC 5.53 4.73   HGB 16.7 14.6   HCT 49.3  bowel obstruction.  There is no pericolonic inflammatory process. There are no signs for diverticulitis or colitis.  The appendix seen and appear unremarkable. Visualized bone structures have unremarkable appearance in the lumbar spine and of the pelvic bones. . 3.  RIGHT LOWER APPENDICULAR SYSTEM: There is a crush injury with the loss of soft tissue substance or a large displaced flap of the distal aspect of the right leg towards the region of the ankle anterior to the anterior tibial muscle/tendon, extensor hallucis longus muscle and tendon and extension digitorum longus extending close to the peroneal tendons area.  No conspicuous acute fracture seen in the distal right tibia/fibula or in the talus or in the calcaneus.  There air in the soft tissues as there is large open wound crush injury there is also air in the anterior aspect of the ankle joint. Due the large field-of-view to cover from upper abdomen to the lower extremities there is loss of detail in the sagittal and coronal images and they make difficult evaluation of fine detail.  If more dedicated evaluation of the right ankle is needed in clinical management a specific evaluation with CT of the right ankle and foot is recommended. . 4.  LEFT LOWER APPENDICULAR SYSTEM: No conspicuous crush injury is seen in left lower extremity.     1.  Normal CTA of the aorta/iliac arteries/right and left lower extremity arteries from common femoral artery to above the ankle level. 2.  Normal CT abdomen/pelvis arterial phase/early portal venous phase. 3.  In the anterior aspect of the right ankle there is a large traumatic crush injury with loss of soft tissue substance of formation of a large displaced flap.  It appears to be causing injury to the distal right anterior tibial artery/dorsal pedal artery with lack contrast flow through the site of the injury and reconstitution of flow more distally. 4.  There an overall diminish contrast enhancement of the arteries of

## 2025-06-25 NOTE — PLAN OF CARE
Problem: Discharge Planning  Goal: Discharge to home or other facility with appropriate resources  Flowsheets (Taken 6/25/2025 1818)  Discharge to home or other facility with appropriate resources:   Identify barriers to discharge with patient and caregiver   Arrange for needed discharge resources and transportation as appropriate   Identify discharge learning needs (meds, wound care, etc)   Arrange for interpreters to assist at discharge as needed   Refer to discharge planning if patient needs post-hospital services based on physician order or complex needs related to functional status, cognitive ability or social support system     Problem: Pain  Goal: Verbalizes/displays adequate comfort level or baseline comfort level  Flowsheets (Taken 6/25/2025 1818)  Verbalizes/displays adequate comfort level or baseline comfort level:   Encourage patient to monitor pain and request assistance   Assess pain using appropriate pain scale   Administer analgesics based on type and severity of pain and evaluate response   Implement non-pharmacological measures as appropriate and evaluate response   Consider cultural and social influences on pain and pain management   Notify Licensed Independent Practitioner if interventions unsuccessful or patient reports new pain     Problem: Skin/Tissue Integrity  Goal: Skin integrity remains intact  Description: 1.  Monitor for areas of redness and/or skin breakdown  2.  Assess vascular access sites hourly  3.  Every 4-6 hours minimum:  Change oxygen saturation probe site  4.  Every 4-6 hours:  If on nasal continuous positive airway pressure, respiratory therapy assess nares and determine need for appliance change or resting period  Flowsheets (Taken 6/25/2025 1818)  Skin Integrity Remains Intact:   Monitor for areas of redness and/or skin breakdown   Monitor skin under medical devices   Check visual cues for pain   Turn and reposition as indicated

## 2025-06-25 NOTE — PROGRESS NOTES
Patient resting on cart, resp unlabored, oxygen continued, wound vac to right lower leg in place, pain still controlled, no distress noted.

## 2025-06-25 NOTE — ANESTHESIA POSTPROCEDURE EVALUATION
Department of Anesthesiology  Postprocedure Note    Patient: Antonio Chamberlain  MRN: [id number]  YOB: 2000  Date of evaluation: 6/25/2025    Procedure Summary       Date: 06/25/25 Room / Location: [address]    Anesthesia Start: 1142 Anesthesia Stop: 1400    Procedure: INCISION AND DRAINAGE OF THE RIGHT LEG AND ANKLE + TENDON REPAIR (Right: Ankle) Diagnosis:       Injury of lower extremity, unspecified laterality, initial encounter      (Injury of lower extremity, unspecified laterality, initial encounter [S89.90XA])    Surgeons: El Gregg MD Responsible Provider: Tyshawn Strong MD    Anesthesia Type: General ASA Status: 2 - Emergent            Anesthesia Type: General    Justin Phase I: Justin Score: 10    Justin Phase II:      Anesthesia Post Evaluation    Patient location during evaluation: bedside  Patient participation: complete - patient participated  Level of consciousness: awake and alert  Pain score: 2  Airway patency: patent  Nausea & Vomiting: no nausea and no vomiting  Cardiovascular status: blood pressure returned to baseline and hemodynamically stable  Respiratory status: acceptable, room air, spontaneous ventilation and nonlabored ventilation  Hydration status: euvolemic  Pain management: adequate        No notable events documented.

## 2025-06-25 NOTE — CARE COORDINATION
Met with pt at bedside to discuss discharge planning. Pt from home alone. Pt states he did meet with [employer] and was given papers to complete. Pt had surgery today and dc pending PT/OT evals. Pt does not have any DME or insurance.

## 2025-06-26 ASSESSMENT — PAIN DESCRIPTION - DESCRIPTORS
DESCRIPTORS: ACHING
DESCRIPTORS: ACHING;SHARP

## 2025-06-26 ASSESSMENT — ENCOUNTER SYMPTOMS
SHORTNESS OF BREATH: 0
COUGH: 0
NAUSEA: 0
VOMITING: 0
DIARRHEA: 0

## 2025-06-26 ASSESSMENT — PAIN SCALES - GENERAL
PAINLEVEL_OUTOF10: 8
PAINLEVEL_OUTOF10: 10
PAINLEVEL_OUTOF10: 7
PAINLEVEL_OUTOF10: 7
PAINLEVEL_OUTOF10: 10

## 2025-06-26 ASSESSMENT — PAIN DESCRIPTION - ORIENTATION
ORIENTATION: RIGHT

## 2025-06-26 ASSESSMENT — PAIN DESCRIPTION - LOCATION
LOCATION: LEG;FOOT
LOCATION: FOOT
LOCATION: LEG;FOOT

## 2025-06-26 NOTE — PROGRESS NOTES
Progress Note  Date:2025       Room:W273/W273-01  Patient Name:Antonio Chamberlain     YOB: 2000     Age:24 y.o.        Subjective    Subjective:  Symptoms:  No shortness of breath, malaise, cough, chest pain, weakness, headache, chest pressure, anorexia, diarrhea or anxiety.    Diet:  No nausea or vomiting.       Review of Systems   Respiratory:  Negative for cough and shortness of breath.    Cardiovascular:  Negative for chest pain.   Gastrointestinal:  Negative for anorexia, diarrhea, nausea and vomiting.   Neurological:  Negative for weakness.     Objective         Vitals Last 24 Hours:  TEMPERATURE:  Temp  Av °F (36.7 °C)  Min: 97.9 °F (36.6 °C)  Max: 98.3 °F (36.8 °C)  RESPIRATIONS RANGE: Resp  Av.8  Min: 10  Max: 24  PULSE OXIMETRY RANGE: SpO2  Av.9 %  Min: 97 %  Max: 100 %  PULSE RANGE: Pulse  Av.4  Min: 53  Max: 111  BLOOD PRESSURE RANGE: Systolic (24hrs), Av , Min:126 , Max:166   ; Diastolic (24hrs), Av, Min:60, Max:97    I/O (24Hr):    Intake/Output Summary (Last 24 hours) at 2025 1121  Last data filed at 2025 1820  Gross per 24 hour   Intake 1000 ml   Output 2000 ml   Net -1000 ml     Objective:  General Appearance:  Comfortable, well-appearing and in no acute distress.    Vital signs: (most recent): Blood pressure 136/75, pulse 84, temperature 97.9 °F (36.6 °C), temperature source Oral, resp. rate 18, height 1.93 m (6' 4\"), weight 93.4 kg (206 lb), SpO2 99%.    HEENT: Normal HEENT exam.    Lungs:  He is not in respiratory distress.    Heart: S1 normal and S2 normal.    Abdomen: Abdomen is soft.  Bowel sounds are normal.     Extremities: Normal range of motion.    Pulses: Distal pulses are intact.    Neurological: Patient is alert.    Pupils:  Pupils are equal, round, and reactive to light.    Skin:  Warm.      Labs/Imaging/Diagnostics    Labs:  CBC:  Recent Labs     25  1316 25  0511 25  0529   WBC 8.1 8.0 10.7   RBC 5.53 4.73  the arteries of the lower extremities. Celiac axis: There is slight impression from the arcuate ligament in the proximal segment of the celiac axis with minimal stenosis.  There is good contrast enhancement to the distal celiac axis, and into the hepatic arteries splenic artery. SMA: There is no stenosis.  Some motion artifacts are present.  There is good contrast enhancement. Right Renal artery: There is no stenosis.  There is good contrast enhancement. Left Renal artery: There is no stenosis.  There is good contrast enhancement. BUDDY: There is no stenosis.  There is good contrast enhancement. . 1.2.  RIGHT ILIAC ARTERIAL SYSTEM/RIGHT LOWER EXTREMITY ARTERIES: Right iliac arterial system: No stenosis.  Good contrast enhancement. Right common/superficial femoral arteries: No stenosis.  Good contrast enhancement. Right popliteal artery: No stenosis.  Good contrast enhancement. Right anterior tibial artery: No stenosis/obstruction.  Good contrast enhancement down to the level of above the right.  Is a large area of traumatic crush injury to the anterior aspect of right ankle with loss of soft tissue substance of formation of a large displaced flap which exposes the soft tissues and part of the anterior aspect right ankle joint to the outside.  There is interruption of flow in distal right anterior tibial artery in the dorsal aspect of the right ankle where the crush injury is located with reconstitution of flow more distally. Right posterior tibial artery: There is no stenosis/obstruction, but it is a small cross-sectional vessel diameter.  It forms the plantar arches. There is diminish contrast enhancement of the arteries of the right foot more likely due early imaging acquisition for the distal lower extremities Right peroneal artery: It is a small cross-sectional diameter vessel.  There is no obstruction.  It forms a posterolateral branch at the level of the right ankle. . 1.3.  LEFT ILIAC ARTERIAL SYSTEM/LEFT LOWER  reconstitution of flow more distally. 4.  There an overall diminish contrast enhancement of the arteries of the right and left foot more likely due early imaging acquisition for the distal lower extremities, despite the traumatic crush injury in the anterior aspect of the right ankle.     XR ANKLE RIGHT (MIN 3 VIEWS)  Result Date: 6/24/2025  EXAMINATION: THREE XRAY VIEWS OF THE RIGHT ANKLE 6/24/2025 1:41 pm COMPARISON: None. HISTORY: ORDERING SYSTEM PROVIDED HISTORY: crush injury TECHNOLOGIST PROVIDED HISTORY: Reason for exam:->crush injury What reading provider will be dictating this exam?->CRC FINDINGS: The No evidence of acute fracture or dislocation.  Normal alignment of the ankle mortise.  No focal osseous lesion.  No evidence of joint effusion.  Large soft tissue wound anterior to the distal tibia and fibula with adjacent soft tissue edema dorsal to the talus.     No acute abnormality of the ankle. Large soft tissue wound anterior to the distal tibia and fibula with adjacent soft tissue edema dorsal to the talus.     XR TIBIA FIBULA RIGHT (2 VIEWS)  Result Date: 6/24/2025  EXAMINATION: XRAY VIEWS OF THE RIGHT TIBIA AND FIBULA 6/24/2025 1:41 pm COMPARISON: None. HISTORY: ORDERING SYSTEM PROVIDED HISTORY: crush injury TECHNOLOGIST PROVIDED HISTORY: Reason for exam:->crush injury What reading provider will be dictating this exam?->CRC FINDINGS: There is no evidence of acute fracture.  There is normal alignment.  No acute joint abnormality.  No focal osseous lesion.  Soft tissue edema dorsal to the talus with large soft tissue wound anterior to the distal tibia and fibula.     No acute osseous abnormality. Large soft tissue wound anterior to the distal tibia and fibula with adjacent soft tissue edema dorsal to the talus.     XR TIBIA FIBULA LEFT (2 VIEWS)  Result Date: 6/24/2025  EXAMINATION: XRAY VIEWS OF THE LEFT TIBIA AND FIBULA 6/24/2025 1:41 pm COMPARISON: None. HISTORY: ORDERING SYSTEM PROVIDED HISTORY:
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
## 2025-06-26 NOTE — PROGRESS NOTES
DAILY PROGRESS NOTE      POD: 1    Patient doing well  Vitals:    25 1017   BP: 136/75   Pulse: 84   Resp: 18   Temp: 97.9 °F (36.6 °C)   SpO2: 99%      Temp (24hrs), Av °F (36.7 °C), Min:97.9 °F (36.6 °C), Max:98.3 °F (36.8 °C)       Pain Control Good  No chest pain or shortness of breath.  No calf pain    Exam:   Incision(s): surgical splint and previsno  Dressing clean, dry, and intact  Operative extremity shows neuro vascular status intact. Flexion and extension intact on operative extremity  Calves soft and non-tender without evidence of DVT.  .      Labs reviewed:  CBC:   Recent Labs     25  1316 25  0511 25  0529   WBC 8.1 8.0 10.7   HGB 16.7 14.6 13.6*    192 194     BMP:    Recent Labs     25  1309 25  0511 25  0529    143 139   K 5.1* 3.7 4.3    106 103   CO2 21 27 25   BUN 15 12 14   CREATININE 1.11 1.10 1.05   GLUCOSE 129* 97 117*       I&O  I/O last 3 completed shifts:  In: 1000 [I.V.:1000]  Out:  [Urine:]      Assessment:  Good Post Operative Course. Pt is able to wiggle his toes   Plan for pt/ ot and NWB to the tight   Pt does have previna in place   Plan is to removal dressing in office next week  Pain is controlled   Ortho meds sent to pharmacy  Anticipate crutches- rx in chart   Pt did receive lovenox for dvt prophylaxis   For home asa will be the plan    Plan:  as above    Shira Bell, HAYLIE - CNP   2025 12:41 PM
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
## 2025-06-26 NOTE — CARE COORDINATION
DME ORDER FOR CRUTCHES PLACED BY ORTHO.   CALL PLACED TO STOCK ROOM, WILL DELIVER CRUTCHES TO UNIT FOR PT.

## 2025-06-26 NOTE — PLAN OF CARE
See OT evaluation for all goals and OT POC. Electronically signed by Mis Seagl OTR/L on 6/26/2025 at 4:17 PM     No

## 2025-06-26 NOTE — CARE COORDINATION
This LSW visited patient at bedside this afternoon.  I completed Social Work Consult with patient for discharge planning.  Patient will be staying with his mother or his girlfriends home upon discharge.  Patient does have crutches.  Electronically signed by ESTELA Palma on 6/26/25 at 2:50 PM EDT

## 2025-06-26 NOTE — DISCHARGE INSTRUCTIONS
Medication given may have significant effects after discharge. Therefore on the day of surgery:  1) you must be accompanied by a responsible adult upon discharge and for 24 hours after surgery.  Do not drive a motor vehicle, operate machinery, power tools or appliance, drink alcoholic beverages, or make critical decisions for 24 hours  2) Be aware of dizziness, which may cause a fall. Change positions slowly.  3) Eating: you may resume your regular diet but it is better to increase intake slowly with mild foods and working up to your regular diet. No greasy, fried or spicy foods today.  4) Nausea/Vomiting: Nausea and vomiting may occur as you become more active or begin to increase food intake. If this should happen, decrease activity and return to liquids. If the problem persists, call your surgeon  5) Pain: Your surgeon may have given you a prescription for pain medication. Take pain medication with food as prescribed. Pain medication may cause constipation, so drink plenty of fluids. If your pain medication does not provide adequate relief, call your surgeon  6) Urinating: Notify your surgeon if you have not urinated within 12 hours after discharge  7) Ice: Apply ice to operative site for 20 min 5-6 times a day or use Polar care as instructed  8) Dressing:     Do not remove the steri-strips. (no bath/ hot tubs/ pools)   []  Leave dressing in place. Keep dressing/ incision clean and dry    9) Activity    Knee/ Ankle/ Foot   [x] elevate extremity   [x] crutches        [x] non-weight bearing to operative extremity       10) Begin physical therapy if advised by you physician:          Contact Orthopedics office if  Increased redness, swelling, drainage of any kind, and/or pain to surgery site.  As well as new onset fevers and or chills.  These could signify an infection.  Calf or thigh tenderness to touch as well as increased swelling or redness.  This could signify a clot formation.  Numbness or tingling to an

## 2025-06-26 NOTE — PROGRESS NOTES
[facility] OCCUPATIONAL THERAPY EVALUATION - ACUTE     NAME: Antonio Chamberlain  : 2000 (24 y.o.)  MRN: [id number]  CODE STATUS: Full Code  [facility]    Date of Service: 2025    Patient Diagnosis(es): Lactic acidosis [E87.20]  Elevated CK [R74.8]  Leg laceration, right, initial encounter [S81.811A]  Crush injury lower leg, right, initial encounter [S87.81XA]  Injury of right lower extremity, initial encounter [S89.91XA]  Anterior tibial tendon tear, traumatic [S86.219A]   Patient Active Problem List    Diagnosis Date Noted    Laceration of muscle(s) and tendon(s) of anterior muscle group at lower leg level, right leg, initial encounter 2025    Crush injury lower leg, right, initial encounter 2025    Leg laceration, right, initial encounter 2025      Injury of right lower extremity, initial encounter  Elevated CK  Anterior tibial tendon tear, traumatic  Lactic acidosis  Crush injury  Leg laceration, right, initial encounter    Past Medical History:   Diagnosis Date    Pneumothorax      Past Surgical History:   Procedure Laterality Date    ANKLE SURGERY Right 2025    INCISION AND DRAINAGE OF THE RIGHT LEG AND ANKLE + TENDON REPAIR [place] [facility] [place]        Restrictions  Restrictions/Precautions: Weight Bearing  Activity Level: Up as Tolerated   Up as Tolerated     Right Lower Extremity Weight Bearing: Non Weight Bearing       Safety Devices: Safety Devices  Type of Devices: Call light within reach;Left in bed     Treatment Diagnosis:   Decreased Activities of Daily Living Z73.6    Patient's date of birth confirmed: Yes    General:  Chart Reviewed: Yes  Patient assessed for rehabilitation services?: Yes    Subjective  Subjective: \"I feel pretty okay\"       Pain at start of treatment: Yes: 2-3/10    Pain at end of treatment: Yes: 2-310    Location: R leg  Description: Sore  Nursing notified: Declined  RN:   Intervention: Repositioned    Prior Level  of Function:  Social/Functional History  Lives With: Alone  Type of Home: House  Home Layout: Two level  Home Access: Stairs to enter with rails  Entrance Stairs - Number of Steps: 4  Bathroom Shower/Tub: Tub/Shower unit  Bathroom Toilet: Standard  Bathroom Equipment: None  Bathroom Accessibility: Accessible  Home Equipment: None  Receives Help From: Other (Comment) (self care.)  Prior Level of Assist for ADLs: Independent  Prior Level of Assist for Homemaking: Independent  Homemaking Responsibilities: Yes  Prior Level of Assist for Ambulation: Independent household ambulator, with or without device, Independent community ambulator, with or without device  Prior Level of Assist for Transfers: Independent  Active : Yes  Mode of Transportation: Car  Occupation: Full time employment  Type of Occupation:   Additional Comments: Plans to go to mothers with 3-4 step entry intially and then to girlfriends home with 2 step entry. He will stay on main level in both locations    OBJECTIVE:     Orientation Status:  Orientation  Overall Orientation Status: Within Functional Limits    Observation:  Observation/Palpation  Posture: Good  Observation: Pt alert and attentive, no acute distress, agreeable to therapy assessment    Cognition Status:  Cognition  Overall Cognitive Status: WFL    Perception Status:  Perception  Overall Perceptual Status: WFL    Vision and Hearing Status:  Vision  Vision: Within Functional Limits   Vision - Basic Assessment  Prior Vision: No visual deficits  Visual History: No significant visual history  Patient Visual Report: No visual complaint reported.  Visual Field Cut: No  Oculo Motor Control: WNL   Hearing  Hearing: Within functional limits    GROSS ASSESSMENT AROM/PROM:  AROM: Within functional limits       ROM:   LUE AROM (degrees)  LUE AROM : WFL  Left Hand AROM (degrees)  Left Hand AROM: WFL  RUE AROM (degrees)  RUE AROM : WFL  Right Hand AROM (degrees)  Right Hand AROM:

## 2025-06-26 NOTE — PROGRESS NOTES
CLINICAL PHARMACY NOTE: MEDS TO BEDS    Total # of Prescriptions Filled: 5   The following medications were delivered to the patient:  Oxycodone 5 mg Tab  Hydroxyzine 10 mg Tab  Aspirin 81 mg Tab  Cephalexin 500 mg Cap  Senna-S 8.6-50 mg Tab    Additional Documentation:

## 2025-06-26 NOTE — PLAN OF CARE
Problem: Discharge Planning  Goal: Discharge to home or other facility with appropriate resources  6/26/2025 0136 by Mane Telles, RN  Outcome: Progressing     Problem: Pain  Goal: Verbalizes/displays adequate comfort level or baseline comfort level  6/26/2025 0136 by Mane Telles, RN  Outcome: Progressing     Problem: Skin/Tissue Integrity  Goal: Skin integrity remains intact  Description: 1.  Monitor for areas of redness and/or skin breakdown  2.  Assess vascular access sites hourly  3.  Every 4-6 hours minimum:  Change oxygen saturation probe site  4.  Every 4-6 hours:  If on nasal continuous positive airway pressure, respiratory therapy assess nares and determine need for appliance change or resting period  6/26/2025 0136 by Mane Telles, RN  Outcome: Progressing

## 2025-06-26 NOTE — DISCHARGE INSTR - DIET

## 2025-06-26 NOTE — PLAN OF CARE
Problem: Discharge Planning  Goal: Discharge to home or other facility with appropriate resources  6/26/2025 1017 by Megan Kim, RN  Outcome: Progressing  6/26/2025 0136 by Mane Telles RN  Outcome: Progressing     Problem: Pain  Goal: Verbalizes/displays adequate comfort level or baseline comfort level  6/26/2025 1017 by Megan Kim RN  Outcome: Progressing  6/26/2025 0136 by Mane Telles RN  Outcome: Progressing     Problem: Skin/Tissue Integrity  Goal: Skin integrity remains intact  Description: 1.  Monitor for areas of redness and/or skin breakdown  2.  Assess vascular access sites hourly  3.  Every 4-6 hours minimum:  Change oxygen saturation probe site  4.  Every 4-6 hours:  If on nasal continuous positive airway pressure, respiratory therapy assess nares and determine need for appliance change or resting period  6/26/2025 1017 by Megan Kim, RN  Outcome: Progressing  6/26/2025 0136 by Mane Telles RN  Outcome: Progressing

## 2025-06-26 NOTE — PROGRESS NOTES
Physician Progress Note      PATIENT:               NIK ROMO  CSN #:                  [id number]  :                       2000  ADMIT DATE:       2025 12:53 PM  DISCH DATE:  RESPONDING  PROVIDER #:        Joyce Aguayo MD          QUERY TEXT:    Based on your medical judgment, please clarify these findings and document if   any of the following are being evaluated and/or treated:    The clinical indicators include:  -male age 24  -25 Op Note Dr Gregg: \"Estimated Blood Loss: less than 100\"  -25 NaCl IVF continuous  -25 H/H 16.7/49.3  -25 H/H 13.6/39.3  Options provided:  -- This patient has postoperative acute blood loss anemia.  -- Other - I will add my own diagnosis  -- Disagree - Not applicable / Not valid  -- Disagree - Clinically unable to determine / Unknown  -- Refer to Clinical Documentation Reviewer    PROVIDER RESPONSE TEXT:    This patient has postoperative acute blood loss anemia.    Query created by: Srinivas Guzman on 2025 12:35 PM      Electronically signed by:  Joyce Aguayo MD 2025 12:38 PM

## 2025-06-27 NOTE — PROGRESS NOTES
Spoke to the mother of the patient and recommended the patient to be admitted to the hospital for IV antibiotics.  She will FU with Dr. Gregg and PCP. Pt was never cleared medically by me to be discharged.

## 2025-07-01 ENCOUNTER — OFFICE VISIT (OUTPATIENT)
Age: 25
End: 2025-07-01

## 2025-07-01 VITALS
OXYGEN SATURATION: 98 % | WEIGHT: 206 LBS | HEART RATE: 80 BPM | TEMPERATURE: 97.6 F | BODY MASS INDEX: 25.09 KG/M2 | HEIGHT: 76 IN

## 2025-07-01 DIAGNOSIS — S86.221D: ICD-10-CM

## 2025-07-01 DIAGNOSIS — S86.229A LACERATION OF TIBIALIS ANTERIOR TENDON: ICD-10-CM

## 2025-07-01 DIAGNOSIS — Z09 POSTOPERATIVE FOLLOW-UP: Primary | ICD-10-CM

## 2025-07-01 PROCEDURE — 99024 POSTOP FOLLOW-UP VISIT: CPT | Performed by: ORTHOPAEDIC SURGERY

## 2025-07-01 NOTE — PROGRESS NOTES
Follow-up Visit             Date: 7/1/2025        Patient Name: Antonio Chamberlain     MRN: [id number] YOB: 2000      Age:  24 y.o.    Assessment/Plan:      Diagnosis Orders   1. Postoperative follow-up        2. Laceration of muscle(s) and tendon(s) of anterior muscle group at lower leg level, right leg, initial encounter        3. Laceration of tibialis anterior tendon            Surgery/injury: I&D right leg wound, I&D right ankle arthrotomy, TA tendon repair     Date of surgery/injury: 6/26/2025 About 1 week post-surgery/injury      S/p I&D of right ankle wound and ankle traumatic arthrotomy, tibialis anterior tendon repair.  DOS 6/26/2025    Weightbearing status: Non weight-bearing right lower extremity  Incisional wound VAC removed in office today and dry dressing applied  Patient transitioned to removable fracture boot today.  Asked him to wear this most of the time to continue to splint ankle and take tension off TA tendon repair  For now would like to continue to keep wound clean, covered, dry  Discussed with patient overall wound looks good, suspect we are will have necrosis of the very tip of the skin flap but do not think this will require any surgical intervention.  May require minor wound care and allow to healing by secondary intention    Follow-up: About 10 days or suture removal and wound check.  Does not need x-ray    No orders of the defined types were placed in this encounter.    No orders of the defined types were placed in this encounter.    Return in about 10 days (around 7/11/2025).    El Gregg MD  Orthopaedic Trauma Surgery    Chief Complaint:      Status post I&D of right ankle wound and traumatic arthrotomy.  TA tendon repair. Date of Surgery 6/26/25    HPI:     Follow-up Visit    Patient presents for 1 week follow-up check.  Overall doing well.  Pain well-controlled.  Has been taking splint.  Presented to emergency room for IV antibiotics at request of hospitalist

## 2025-07-08 NOTE — DISCHARGE SUMMARY
Discharge summary  This patient Antonio Chamberlain was admitted to the hospital on 6/24/2025  and underwentProcedure(s) (LRB):  INCISION AND DRAINAGE OF THE RIGHT LEG AND ANKLE + TENDON REPAIR (Right) without complications on 6/25/25    During the postoperative period,while in hospital, patient was medically managed by the hospitalist. Please see medial notes and H&P for patients additional diagnoses.  Ortho agrees with all medical diagnoses and treatments while patient in hospital.  No significant or unexpected findings or abnormal ortho imaging were noted during the hospital stay    Hospital course  Patient tolerated surgical procedure well and there was no complications. Patient progressed adequtly through their recovery during hospital stay including PT and rehabilitation.  DVT prophylaxis was implemented POD#1  Patient was then D/C on 6/26/2025 to Home  in stable condition.  Patient was instructed on the use of pain medications, the signs and symptoms of infection, and was given our number to call should they have any questions or concerns following discharge.

## 2025-07-15 ENCOUNTER — OFFICE VISIT (OUTPATIENT)
Age: 25
End: 2025-07-15

## 2025-07-15 DIAGNOSIS — S86.221D: ICD-10-CM

## 2025-07-15 DIAGNOSIS — S86.229A LACERATION OF TIBIALIS ANTERIOR TENDON: ICD-10-CM

## 2025-07-15 DIAGNOSIS — Z09 POSTOPERATIVE FOLLOW-UP: Primary | ICD-10-CM

## 2025-07-15 PROCEDURE — 99024 POSTOP FOLLOW-UP VISIT: CPT | Performed by: ORTHOPAEDIC SURGERY

## 2025-07-15 NOTE — PROGRESS NOTES
Follow-up Visit             Date: 7/15/2025        Patient Name: Antonio Chamberlain     MRN: [id number] YOB: 2000      Age:  24 y.o.    Assessment/Plan:      Diagnosis Orders   1. Postoperative follow-up        2. Laceration of muscle(s) and tendon(s) of anterior muscle group at lower leg level, right leg, subsequent encounter        3. Laceration of tibialis anterior tendon              Surgery/injury: I&D right leg wound, I&D right ankle arthrotomy, TA tendon repair     Date of surgery/injury: 6/26/2025 About 3 weeks post-surgery/injury      S/p I&D of right ankle wound and ankle traumatic arthrotomy, tibialis anterior tendon repair.  DOS 6/26/2025    Weightbearing status: Non weight-bearing right lower extremity  We will plan on keeping patient nonweightbearing approximately 6 weeks postoperatively and then advancing this likely at her next appointment  Sutures removed in office today.  Okay for patient to get wound wet in shower with running water but advised against any soaking type activities for now until wound better healed   Will continue to monitor/local wound care for patient's wound.  This looks like this will be fairly minimal    Follow-up: 3-4 weeks without x-ray    No orders of the defined types were placed in this encounter.    No orders of the defined types were placed in this encounter.    Return in about 3 weeks (around 8/5/2025).    El Gregg MD  Orthopaedic Trauma Surgery    Chief Complaint:      Status post I&D of right ankle wound and traumatic arthrotomy.  TA tendon repair. Date of Surgery 6/26/25    HPI:     Follow-up Visit    Patient presents for routine follow-up and suture removal.  He is doing well.  No longer taking any pain medication.  He has been wearing his boot most of the time and using crutches to help with ambulation.  No new concerns or complaints    Physical Exam:     There were no vitals taken for this visit.    Examination of patient's right lower extremity

## 2025-08-13 ENCOUNTER — OFFICE VISIT (OUTPATIENT)
Age: 25
End: 2025-08-13

## 2025-08-13 ENCOUNTER — TELEPHONE (OUTPATIENT)
Age: 25
End: 2025-08-13

## 2025-08-13 VITALS
SYSTOLIC BLOOD PRESSURE: 125 MMHG | DIASTOLIC BLOOD PRESSURE: 82 MMHG | TEMPERATURE: 97.6 F | WEIGHT: 206 LBS | HEART RATE: 68 BPM | BODY MASS INDEX: 25.09 KG/M2 | HEIGHT: 76 IN | OXYGEN SATURATION: 98 %

## 2025-08-13 DIAGNOSIS — S86.229A LACERATION OF TIBIALIS ANTERIOR TENDON: ICD-10-CM

## 2025-08-13 DIAGNOSIS — S86.221D: ICD-10-CM

## 2025-08-13 DIAGNOSIS — Z09 POSTOPERATIVE FOLLOW-UP: Primary | ICD-10-CM

## 2025-08-13 PROCEDURE — 99024 POSTOP FOLLOW-UP VISIT: CPT | Performed by: ORTHOPAEDIC SURGERY

## 2025-08-13 RX ORDER — SILVER SULFADIAZINE 10 MG/G
CREAM TOPICAL DAILY
Status: DISCONTINUED | OUTPATIENT
Start: 2025-08-13 | End: 2025-08-14

## 2025-08-14 RX ORDER — SILVER SULFADIAZINE 10 MG/G
CREAM TOPICAL DAILY
Status: SHIPPED | OUTPATIENT
Start: 2025-08-15

## 2025-09-03 ENCOUNTER — HOSPITAL ENCOUNTER (OUTPATIENT)
Dept: PHYSICAL THERAPY | Age: 25
Setting detail: THERAPIES SERIES
Discharge: HOME | End: 2025-09-03
Attending: ORTHOPAEDIC SURGERY
Payer: MEDICAID

## 2025-09-03 PROCEDURE — 97162 PT EVAL MOD COMPLEX 30 MIN: CPT

## 2025-09-03 PROCEDURE — 97110 THERAPEUTIC EXERCISES: CPT
